# Patient Record
Sex: MALE | Race: BLACK OR AFRICAN AMERICAN | NOT HISPANIC OR LATINO
[De-identification: names, ages, dates, MRNs, and addresses within clinical notes are randomized per-mention and may not be internally consistent; named-entity substitution may affect disease eponyms.]

---

## 2019-08-19 ENCOUNTER — TRANSCRIPTION ENCOUNTER (OUTPATIENT)
Age: 69
End: 2019-08-19

## 2022-05-05 ENCOUNTER — NON-APPOINTMENT (OUTPATIENT)
Age: 72
End: 2022-05-05

## 2022-05-05 ENCOUNTER — APPOINTMENT (OUTPATIENT)
Dept: CARDIOLOGY | Facility: CLINIC | Age: 72
End: 2022-05-05
Payer: COMMERCIAL

## 2022-05-05 VITALS
TEMPERATURE: 97.3 F | DIASTOLIC BLOOD PRESSURE: 60 MMHG | WEIGHT: 221 LBS | BODY MASS INDEX: 29.29 KG/M2 | RESPIRATION RATE: 16 BRPM | HEIGHT: 73 IN | OXYGEN SATURATION: 98 % | SYSTOLIC BLOOD PRESSURE: 138 MMHG | HEART RATE: 61 BPM

## 2022-05-05 VITALS — SYSTOLIC BLOOD PRESSURE: 146 MMHG | DIASTOLIC BLOOD PRESSURE: 64 MMHG

## 2022-05-05 DIAGNOSIS — Z00.00 ENCOUNTER FOR GENERAL ADULT MEDICAL EXAMINATION W/OUT ABNORMAL FINDINGS: ICD-10-CM

## 2022-05-05 DIAGNOSIS — R94.31 ABNORMAL ELECTROCARDIOGRAM [ECG] [EKG]: ICD-10-CM

## 2022-05-05 DIAGNOSIS — Z87.891 PERSONAL HISTORY OF NICOTINE DEPENDENCE: ICD-10-CM

## 2022-05-05 DIAGNOSIS — Z78.9 OTHER SPECIFIED HEALTH STATUS: ICD-10-CM

## 2022-05-05 PROCEDURE — 99204 OFFICE O/P NEW MOD 45 MIN: CPT

## 2022-05-05 PROCEDURE — 93000 ELECTROCARDIOGRAM COMPLETE: CPT

## 2022-05-05 RX ORDER — MULTIVITAMIN
TABLET ORAL
Refills: 0 | Status: ACTIVE | COMMUNITY

## 2022-05-05 RX ORDER — CHOLECALCIFEROL (VITAMIN D3) 50 MCG
2000 CAPSULE ORAL
Refills: 0 | Status: ACTIVE | COMMUNITY

## 2022-05-05 RX ORDER — AMLODIPINE BESYLATE 5 MG/1
5 TABLET ORAL
Qty: 90 | Refills: 1 | Status: ACTIVE | COMMUNITY
Start: 1900-01-01 | End: 1900-01-01

## 2022-05-05 NOTE — DISCUSSION/SUMMARY
[FreeTextEntry1] : 72-year-old gentleman with above medical history and active medical problems as noted below\par 1.  Unifocal PVCs.  Frequent.  Normal QT interval.  No syncopal event.  Preserved LV systolic function.  Equivocal exercise treadmill stress test.  Increased frequency during the treadmill exercise.\par Obtain event monitor which was done with prior cardiology office.\par Considering CAD risk factors which includes his age, gender, hypertension, frequent PVCs and with equivocal exercise treadmill stress test it is important to assess evaluate and rule out any significant obstructive coronary artery disease.\par Reviewed role of coronary CTA for further assessment and ruling out any significant obstructive disease discussed.  Risk of radiation and contrast were reviewed.\par As long as no significant obstructive CAD we will discuss further regarding long-term management.  Which may include antiarrhythmic management with possible addition of beta-blockers or changing amlodipine to calcium channel blocker like verapamil or diltiazem.\par Also consideration regarding heart rhythm specialist evaluation for PVC management specifically if there is decreasing LV ejection fraction or worsening LV dimension over time.\par \par #2 essential hypertension.  No CHF.  Improved blood pressure control according to him.  Tolerating amlodipine well.  Increasing exercise.  Low-salt diet.  Avoid alcohol.  Continue to follow.  If there is evidence of atherosclerotic vascular disease he would benefit from addition of ACE inhibitor or angiotensin receptor blocker to the present regimen.  We will discuss this further with him at the next office visit.  Goal would be to keep systolic and diastolic blood pressure less than 130/80.\par Continue aggressive lifestyle modifications.\par 3.  Lipid panel.  Overall stable.  10-year risk is higher.  Based on coronary CTA may need to consider statin therapy.  Meanwhile continue lifestyle and risk factor modifications.\par 4.  Mild to moderate valvular heart disease borderline pulmonary artery systolic pressure.  Blood pressure control.  Repeat echocardiogram to follow on pulmonary pressures once the blood pressure is better controlled.  Overall it should improve with hypertension management considering no significant evidence of left heart disease or COPD/pulmonary disease.\par \par Counseling regarding low saturated fat, salt and carbohydrate intake was reviewed. Active lifestyle and regular. Exercise along with weight management is advised.\par All the above were at length reviewed. Answered all the questions. Thank you very much for this kind referral. Please do not hesitate to give me a call for any question.\par Part of this transcription was done with voice recognition software and phonetically similar errors are common. I apologize for that. Please do not hesitate to call for any questions due to above.\par \par Sincerely,\par Surjit Ramos MD,FACC,FASBIRD\par

## 2022-05-05 NOTE — ASSESSMENT
[FreeTextEntry1] : Reviewed on May 5, 2022.  Labs October 14, 2021.  Normal CBC.  Sodium 142 potassium 4.8 creatinine 0.86 LFT stable.  Total cholesterol 149 triglycerides 68 HDL 47 LDL 88 TSH 1.62\par \par Echocardiogram, EKG, stress test as noted above

## 2022-05-05 NOTE — CARDIOLOGY SUMMARY
[de-identified] : May 5, 2022.  Normal sinus rhythm.  Frequent PVCs.  Normal QT interval. [de-identified] : Exercise treadmill stress test February 11, 2022.  9 minutes 13 seconds of Jose Raul protocol 10.6 METs of workload.  Frequent PVCs.  Equivocal for exercise-induced ischemia. [de-identified] : December 28, 2021.  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

## 2022-05-05 NOTE — REVIEW OF SYSTEMS
[Negative] : Heme/Lymph [SOB] : shortness of breath [Dyspnea on exertion] : dyspnea during exertion [Snoring] : no snoring

## 2022-05-05 NOTE — REASON FOR VISIT
[Symptom and Test Evaluation] : symptom and test evaluation [Arrhythmia/ECG Abnorrmalities] : arrhythmia/ECG abnormalities [Hypertension] : hypertension [FreeTextEntry3] :  [FreeTextEntry1] : 72-year-old -American gentleman is referred to me for consultation in presence of\par 1.  Essential hypertension.  On amlodipine.  No significant side effects.  No history of CHF.  No history of CKD.  Non-smoker.\par 2.  Frequent PVCs.  Noted on EKG during routine examination.  Subsequent cardiology evaluation with echocardiogram December 2021.  Preserved LV ejection fraction\par Exercise treadmill stress test February 2022 showing equivocal exercise-induced ischemia with frequent PVCs.\par 3 mild mitral regurgitation mild to moderate tricuspid regurgitation RVSP 38 mmHg.\par \par At baseline he is active.  Walks a lot.  Has started doing more aggressive 20 minutes of exercise 5 times a week.\par He has no history of significant chest pain.  He has exertional dyspnea but without PND orthopnea pedal edema.  He denies any significant palpitation dizziness near syncopal or syncopal event.\par \par \par He has no history of diabetes mellitus, cerebrovascular accident, myocardial infarction, coronary artery disease, peripheral arterial disease, rheumatic fever, thyroid or Lyme disease.\par \par He does not smoke.  He has no alcohol intake.  He is trying to restrict his salt and carbohydrate intake.\par \par I have reviewed his labs, echocardiogram and stress test done from outside office.

## 2022-05-26 ENCOUNTER — NON-APPOINTMENT (OUTPATIENT)
Age: 72
End: 2022-05-26

## 2022-06-28 ENCOUNTER — APPOINTMENT (OUTPATIENT)
Dept: CARDIOLOGY | Facility: CLINIC | Age: 72
End: 2022-06-28
Payer: COMMERCIAL

## 2022-06-28 VITALS
SYSTOLIC BLOOD PRESSURE: 138 MMHG | BODY MASS INDEX: 29.42 KG/M2 | HEART RATE: 64 BPM | WEIGHT: 222 LBS | OXYGEN SATURATION: 97 % | HEIGHT: 73 IN | DIASTOLIC BLOOD PRESSURE: 60 MMHG

## 2022-06-28 DIAGNOSIS — R94.30 ABNORMAL RESULT OF CARDIOVASCULAR FUNCTION STUDY, UNSPECIFIED: ICD-10-CM

## 2022-06-28 PROCEDURE — 99214 OFFICE O/P EST MOD 30 MIN: CPT

## 2022-06-28 NOTE — DISCUSSION/SUMMARY
[FreeTextEntry1] : 72-year-old gentleman with above medical history and active medical problems as noted below\par 1.  Unifocal PVCs.  Frequent.  Normal QT interval.  No syncopal event.  Preserved LV systolic function.  Equivocal exercise treadmill stress test.  Increased frequency during the treadmill exercise.\par Known PVCs on event monitor done with prior cardiology office.\par Follow LV ejection fraction dimension.  Any decreasing or worsening LV dimension would require addition of beta-blockers as well as other therapy for reduction in PVC burden\par #2 essential hypertension.  No CHF.  Improved blood pressure control according to him.  Tolerating amlodipine well.  Increasing exercise.  Low-salt diet.  Avoid alcohol.  Continue to follow.   Goal would be to keep systolic and diastolic blood pressure less than 130/80.  He is to follow his blood pressure at home.  According to him it is much better controlled.  As needed addition of angiotensin receptor blocker or thiazide diuretic.\par Continue aggressive lifestyle modifications.\par 3.  Lipid panel.  Evidence of atherosclerotic coronary vascular disease.  Discussed with him regarding risk benefits alternatives of statin therapy.  Started on rosuvastatin 10 mg.  Side effects reviewed.  If he decides to take it once daily he will have repeat labs in 6 weeks as long as no significant side effects.  If there are significant side effects he will contact us immediately.  Continue lifestyle and risk factor modification.  Goal for LDL cholesterol less than 70.\par 4.  Mild to moderate valvular heart disease borderline pulmonary artery systolic pressure.  Blood pressure control.  We will repeat echocardiogram in future for follow-up.\par 5.  Coronary calcification.  Mild.  No significant obstructive CAD on CTA.  Continue lifestyle and risk factor modification including statin therapy.  Reviewed high risk symptoms to call 911.  Good prognostic information as well as preventive strategies discussed with him.\par 6.  Liver cyst noted on CT scan.  Follow-up with your office.\par \par Counseling regarding low saturated fat, salt and carbohydrate intake was reviewed. Active lifestyle and regular. Exercise along with weight management is advised.\par All the above were at length reviewed. Answered all the questions. Thank you very much for this kind referral. Please do not hesitate to give me a call for any question.\par Part of this transcription was done with voice recognition software and phonetically similar errors are common. I apologize for that. Please do not hesitate to call for any questions due to above.\par \par Sincerely,\par Surjit Ramos MD,FACC,FASE\par

## 2022-06-28 NOTE — REASON FOR VISIT
[Symptom and Test Evaluation] : symptom and test evaluation [Arrhythmia/ECG Abnorrmalities] : arrhythmia/ECG abnormalities [Hypertension] : hypertension [FreeTextEntry3] :  [FreeTextEntry1] : 72-year-old -American gentleman comes in for follow-up consultation to review his cardiovascular test and discuss about medical management.\par 1.  Essential hypertension.  On amlodipine.  No significant side effects.  No history of CHF.  No history of CKD.  Non-smoker.\par 2.  Frequent PVCs.  Noted on EKG during routine examination.  Subsequent cardiology evaluation with echocardiogram December 2021.  Preserved LV ejection fraction\par Exercise treadmill stress test February 2022 showing equivocal exercise-induced ischemia with frequent PVCs.\par 3 mild mitral regurgitation mild to moderate tricuspid regurgitation RVSP 38 mmHg.\par \par At baseline he is active.  Walks a lot.  Has started doing more aggressive 20 minutes of exercise 5 times a week.\par He has no history of significant chest pain.  He has exertional dyspnea but without PND orthopnea pedal edema.  He denies any significant palpitation dizziness near syncopal or syncopal event.\par \par \par He has no history of diabetes mellitus, cerebrovascular accident, myocardial infarction, coronary artery disease, peripheral arterial disease, rheumatic fever, thyroid or Lyme disease.\par \par He does not smoke.  He has no alcohol intake.  He is trying to restrict his salt and carbohydrate intake.\par \par I have reviewed his coronary CTA

## 2022-06-28 NOTE — ASSESSMENT
[FreeTextEntry1] : Reviewed on May 5, 2022.  Labs October 14, 2021.  Normal CBC.  Sodium 142 potassium 4.8 creatinine 0.86 LFT stable.  Total cholesterol 149 triglycerides 68 HDL 47 LDL 88 TSH 1.62\par \par Echocardiogram, EKG, stress test as noted above\par \par Reviewed on June 28, 2022.\par Cardiac CT reviewed

## 2022-06-28 NOTE — CARDIOLOGY SUMMARY
[de-identified] : May 5, 2022.  Normal sinus rhythm.  Frequent PVCs.  Normal QT interval. [de-identified] : Exercise treadmill stress test February 11, 2022.  9 minutes 13 seconds of Jose Raul protocol 10.6 METs of workload.  Frequent PVCs.  Equivocal for exercise-induced ischemia. [de-identified] : December 28, 2021.  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 [de-identified] : cardiac CTA: atheroslcerosis without significant stenosis. CCS score 26, liver cysts.

## 2022-06-28 NOTE — PHYSICAL EXAM
[Well Developed] : well developed [Well Nourished] : well nourished [No Acute Distress] : no acute distress [Normal S1, S2] : normal S1, S2 [No Rub] : no rub [No Respiratory Distress] : no respiratory distress  [Normal Gait] : normal gait [No Edema] : no edema [No Cyanosis] : no cyanosis [No Clubbing] : no clubbing [No Varicosities] : no varicosities [Moves all extremities] : moves all extremities [Normal Speech] : normal speech [Alert and Oriented] : alert and oriented

## 2022-06-28 NOTE — REVIEW OF SYSTEMS
[SOB] : shortness of breath [Dyspnea on exertion] : dyspnea during exertion [Negative] : Heme/Lymph [Snoring] : no snoring

## 2022-12-13 ENCOUNTER — APPOINTMENT (OUTPATIENT)
Dept: CARDIOLOGY | Facility: CLINIC | Age: 72
End: 2022-12-13

## 2022-12-28 ENCOUNTER — APPOINTMENT (OUTPATIENT)
Dept: CARDIOLOGY | Facility: CLINIC | Age: 72
End: 2022-12-28

## 2022-12-28 VITALS
HEART RATE: 61 BPM | BODY MASS INDEX: 29.29 KG/M2 | HEIGHT: 73 IN | OXYGEN SATURATION: 98 % | SYSTOLIC BLOOD PRESSURE: 136 MMHG | DIASTOLIC BLOOD PRESSURE: 68 MMHG | WEIGHT: 221 LBS

## 2022-12-28 DIAGNOSIS — I49.3 VENTRICULAR PREMATURE DEPOLARIZATION: ICD-10-CM

## 2022-12-28 PROCEDURE — 99214 OFFICE O/P EST MOD 30 MIN: CPT

## 2022-12-28 RX ORDER — PRAVASTATIN SODIUM 20 MG/1
20 TABLET ORAL
Qty: 90 | Refills: 3 | Status: ACTIVE | COMMUNITY
Start: 2022-12-28

## 2022-12-28 RX ORDER — ROSUVASTATIN CALCIUM 10 MG/1
10 TABLET, FILM COATED ORAL
Qty: 90 | Refills: 3 | Status: DISCONTINUED | COMMUNITY
Start: 2022-06-28 | End: 2022-12-28

## 2022-12-28 NOTE — CARDIOLOGY SUMMARY
[de-identified] : May 5, 2022.  Normal sinus rhythm.  Frequent PVCs.  Normal QT interval. [de-identified] : Exercise treadmill stress test February 11, 2022.  9 minutes 13 seconds of Jose Raul protocol 10.6 METs of workload.  Frequent PVCs.  Equivocal for exercise-induced ischemia. [de-identified] : December 28, 2021.  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 [de-identified] : cardiac CTA: atheroslcerosis without significant stenosis. CCS score 26, liver cysts.

## 2022-12-28 NOTE — DISCUSSION/SUMMARY
[FreeTextEntry1] : 72-year-old gentleman with above medical history and active medical problems as noted below\par 1.  Unifocal PVCs.  Frequent.  Normal QT interval.  No syncopal event.  Preserved LV systolic function.  Equivocal exercise treadmill stress test.  Increased frequency during the treadmill exercise.\par Known PVCs on event monitor done with prior cardiology office.\par Follow LV ejection fraction dimension.  Any decreasing or worsening LV dimension would require addition of beta-blockers as well as other therapy for reduction in PVC burden\par #2 essential hypertension.  No CHF.  Mild elevation of blood pressure.  Tolerating amlodipine well.  Increasing exercise.  Low-salt diet.  Avoid alcohol.  Continue to follow.   Goal would be to keep systolic and diastolic blood pressure less than 130/80.  If still remains elevated we will increase dose of amlodipine to 10 mg or add thiazide diuretics.\par Continue aggressive lifestyle modifications.\par 3.  Lipid panel.  Evidence of atherosclerotic coronary vascular disease.  Discussed with him regarding risk benefits alternatives of statin therapy.  Could not tolerate rosuvastatin 10 mg.  He has started him on pravastatin 20 mg.  He will have repeat labs on it.  So far tolerating it very well.  Continue lifestyle and risk factor modification.  Goal for LDL cholesterol less than 70.  If remains elevated increase dose of pravastatin to 40 mg or add Zetia 10 mg to the present regimen.\par 4.  Mild to moderate valvular heart disease borderline pulmonary artery systolic pressure.  Blood pressure control.  We will repeat echocardiogram in future for follow-up.\par 5.  Coronary calcification.  Mild.  No significant obstructive CAD on CTA.  Continue lifestyle and risk factor modification including statin therapy.  Reviewed high risk symptoms to call 911.  Good prognostic information as well as preventive strategies discussed with him.\par 6.  Liver cyst noted on CT scan.  Follow-up with your office.\par \par Counseling regarding low saturated fat, salt and carbohydrate intake was reviewed. Active lifestyle and regular. Exercise along with weight management is advised.\par All the above were at length reviewed. Answered all the questions. Thank you very much for this kind referral. Please do not hesitate to give me a call for any question.\par Part of this transcription was done with voice recognition software and phonetically similar errors are common. I apologize for that. Please do not hesitate to call for any questions due to above.\par \par Sincerely,\par Surjit Ramos MD,FACC,FASE\par

## 2022-12-28 NOTE — REASON FOR VISIT
[Symptom and Test Evaluation] : symptom and test evaluation [Arrhythmia/ECG Abnorrmalities] : arrhythmia/ECG abnormalities [Hypertension] : hypertension [FreeTextEntry3] :  [FreeTextEntry1] : 72-year-old -American gentleman comes in for follow-up consultation to review his cardiovascular test and discuss about medical management.\par 1.  Essential hypertension.  On amlodipine.  No significant side effects.  No history of CHF.  No history of CKD.  Non-smoker.  Not been doing regular exercises.  Not been able to lose weight\par 2.  Frequent PVCs.  Noted on EKG during routine examination.  Subsequent cardiology evaluation with echocardiogram December 2021.  Preserved LV ejection fraction\par Exercise treadmill stress test February 2022 showing equivocal exercise-induced ischemia with frequent PVCs.\par 3 mild mitral regurgitation mild to moderate tricuspid regurgitation RVSP 38 mmHg.\par #4 mild coronary calcification.  Asymptomatic.\par 5.  Dyslipidemia.  Unable to tolerate rosuvastatin now on pravastatin 20 mg\par \par He has no history of significant chest pain.  He has exertional dyspnea but without PND orthopnea pedal edema.  He denies any significant palpitation dizziness near syncopal or syncopal event.\par \par \par He has no history of diabetes mellitus, cerebrovascular accident, myocardial infarction, coronary artery disease, peripheral arterial disease, rheumatic fever, thyroid or Lyme disease.\par \par He does not smoke.  He has no alcohol intake.  He is trying to restrict his salt and carbohydrate intake.\par \par

## 2023-03-02 ENCOUNTER — APPOINTMENT (OUTPATIENT)
Dept: CARDIOLOGY | Facility: CLINIC | Age: 73
End: 2023-03-02
Payer: COMMERCIAL

## 2023-03-02 VITALS
OXYGEN SATURATION: 98 % | WEIGHT: 216 LBS | HEART RATE: 63 BPM | HEIGHT: 73 IN | TEMPERATURE: 97.3 F | DIASTOLIC BLOOD PRESSURE: 70 MMHG | BODY MASS INDEX: 28.63 KG/M2 | SYSTOLIC BLOOD PRESSURE: 140 MMHG

## 2023-03-02 DIAGNOSIS — E78.5 HYPERLIPIDEMIA, UNSPECIFIED: ICD-10-CM

## 2023-03-02 DIAGNOSIS — I25.84 ATHEROSCLEROTIC HEART DISEASE OF NATIVE CORONARY ARTERY W/OUT ANGINA PECTORIS: ICD-10-CM

## 2023-03-02 DIAGNOSIS — I25.10 ATHEROSCLEROTIC HEART DISEASE OF NATIVE CORONARY ARTERY W/OUT ANGINA PECTORIS: ICD-10-CM

## 2023-03-02 DIAGNOSIS — I10 ESSENTIAL (PRIMARY) HYPERTENSION: ICD-10-CM

## 2023-03-02 PROCEDURE — 99214 OFFICE O/P EST MOD 30 MIN: CPT

## 2023-03-02 NOTE — DISCUSSION/SUMMARY
[FreeTextEntry1] : JUSTINO PRUITT  is a 72 year M  who presents today Mar 02, 2023 with the above history and the following active issues. \par \par Unifocal PVCs. Preserved LV systolic function.  Equivocal exercise treadmill stress test.  Increased frequency during the treadmill exercise.\par Known PVCs on event monitor done with prior cardiology office.\par Recommend follow-up echo prior to next office visit. \par Follow LV ejection fraction dimension.  Any decreasing or worsening LV dimension would require addition of beta-blockers as well as other therapy for reduction in PVC burden\par \par Essential hypertension.  Blood pressure on my assessment 126/76.\par Tolerating amlodipine well.  Increasing exercise.  Low-salt diet.  Avoid alcohol.  \par Goal would be to keep systolic and diastolic blood pressure less than 130/80.\par Continue aggressive lifestyle modifications.\par \par Evidence of atherosclerotic coronary vascular disease. Continue pravastatin 20 mg - intolerant to Crestor.\par Recent fasting lipid panel with LDL at goal <70.\par Continue lifestyle and risk factor modification.  \par \par Mild to moderate valvular heart disease borderline pulmonary artery systolic pressure.  Blood pressure control.  We will repeat echocardiogram in future for follow-up.\par \par Coronary calcification.  Mild.  No significant obstructive CAD on CTA.  Continue lifestyle and risk factor modification including statin therapy.  Reviewed high risk symptoms to call 911.  Good prognostic information as well as preventive strategies discussed with him.\par \par Liver cyst noted on CT scan.  Follow-up with your office.\par \par Red flag symptoms which would warrant sooner emergent evaluation reviewed with the patient. \par Questions and concerns were addressed and answered. \par Limitations of non-invasive testing reviewed\par \par Sincerely,\par \par Amy Colon PA-C\par Patients history, testing and plan reviewed with supervising MD: Dr. Cedrick Long

## 2023-03-02 NOTE — REASON FOR VISIT
[Symptom and Test Evaluation] : symptom and test evaluation [Arrhythmia/ECG Abnorrmalities] : arrhythmia/ECG abnormalities [Hypertension] : hypertension [FreeTextEntry3] :  [FreeTextEntry1] : JUSTINO PRUITT  is a 72 year M  who presents today Mar 02, 2023 in clinical follow-up. \par Overall he has been feeling well. There has been no recent illness or hospital stay. Medications have remained unchanged. Asymptomatic from cardiovascular and arrhythmia standpoint. \par Active with walking on a regular basis and doing weight training with no new exertional complaints. \par \par Today he denies chest pain, pressure, unusual shortness of breath, lightheadedness, dizziness, near syncope or syncope. \par \par Active medical problems:\par \par Essential hypertension.  On amlodipine 5mg QD. \par Blood pressure well controlled on my assessment. \par No history of CHF.  No history of CKD.  Non-smoker. \par \par PVCs.  Noted on EKG during routine examination.  Subsequent cardiology evaluation with echocardiogram December 2021.  Preserved LV ejection fraction\par Exercise treadmill stress test February 2022 showing equivocal exercise-induced ischemia with frequent PVCs.\par \par Mild mitral regurgitation mild to moderate tricuspid regurgitation RVSP 38 mmHg.\par \par Mild coronary calcification.  Asymptomatic.\par \par Dyslipidemia.  Unable to tolerate rosuvastatin now on pravastatin 20 mg\par \par \par There is no prior history of myocardial infarction, coronary revascularization, history of ischemic heart disease, or symptomatic congestive heart failure. There is no history of symptomatic arrhythmias including atrial fibrillation. \par \par

## 2023-03-02 NOTE — REVIEW OF SYSTEMS
[Negative] : Heme/Lymph [SOB] : no shortness of breath [Dyspnea on exertion] : not dyspnea during exertion [Snoring] : no snoring

## 2023-03-02 NOTE — CARDIOLOGY SUMMARY
[de-identified] : May 5, 2022.  Normal sinus rhythm.  Frequent PVCs.  Normal QT interval. [de-identified] : Exercise treadmill stress test February 11, 2022.  9 minutes 13 seconds of Jose Raul protocol 10.6 METs of workload.  Frequent PVCs.  Equivocal for exercise-induced ischemia. [de-identified] : December 28, 2021.  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 [de-identified] : cardiac CTA: atheroslcerosis without significant stenosis. CCS score 26, liver cysts.  [de-identified] : Labs  2/24/2023 Sodium 138, Potassium 4.2, BUN 14, Creat 0.88, AST 29, \par ALT 21, Alk phos 66, LDL 68, HDL 53, total cholesterol 135, Triglycerides 68

## 2023-09-12 ENCOUNTER — APPOINTMENT (OUTPATIENT)
Dept: CARDIOLOGY | Facility: CLINIC | Age: 73
End: 2023-09-12